# Patient Record
Sex: FEMALE | Race: WHITE | NOT HISPANIC OR LATINO | Employment: UNEMPLOYED | URBAN - METROPOLITAN AREA
[De-identification: names, ages, dates, MRNs, and addresses within clinical notes are randomized per-mention and may not be internally consistent; named-entity substitution may affect disease eponyms.]

---

## 2021-01-19 ENCOUNTER — OFFICE VISIT (OUTPATIENT)
Dept: OBSTETRICS AND GYNECOLOGY | Facility: CLINIC | Age: 21
End: 2021-01-19

## 2021-01-19 VITALS
BODY MASS INDEX: 20.45 KG/M2 | DIASTOLIC BLOOD PRESSURE: 60 MMHG | SYSTOLIC BLOOD PRESSURE: 110 MMHG | HEIGHT: 64 IN | WEIGHT: 119.8 LBS

## 2021-01-19 DIAGNOSIS — N92.6 MISSED MENSES: ICD-10-CM

## 2021-01-19 DIAGNOSIS — Z86.59 HISTORY OF ANOREXIA NERVOSA: ICD-10-CM

## 2021-01-19 DIAGNOSIS — Z13.9 SCREENING FOR CONDITION: Primary | ICD-10-CM

## 2021-01-19 DIAGNOSIS — Z78.9 VEGAN DIET: ICD-10-CM

## 2021-01-19 LAB
B-HCG UR QL: POSITIVE
INTERNAL NEGATIVE CONTROL: NEGATIVE
INTERNAL POSITIVE CONTROL: POSITIVE
Lab: 55

## 2021-01-19 PROCEDURE — 81025 URINE PREGNANCY TEST: CPT | Performed by: OBSTETRICS & GYNECOLOGY

## 2021-01-19 PROCEDURE — 90471 IMMUNIZATION ADMIN: CPT | Performed by: OBSTETRICS & GYNECOLOGY

## 2021-01-19 PROCEDURE — 99204 OFFICE O/P NEW MOD 45 MIN: CPT | Performed by: OBSTETRICS & GYNECOLOGY

## 2021-01-19 PROCEDURE — 90686 IIV4 VACC NO PRSV 0.5 ML IM: CPT | Performed by: OBSTETRICS & GYNECOLOGY

## 2021-01-19 RX ORDER — RANITIDINE 150 MG/1
150 TABLET ORAL DAILY
COMMUNITY

## 2021-01-19 RX ORDER — PRENATAL VIT NO.126/IRON/FOLIC 28MG-0.8MG
1 TABLET ORAL DAILY
COMMUNITY

## 2021-01-19 RX ORDER — DIPHENHYDRAMINE HYDROCHLORIDE 25 MG/1
25 CAPSULE ORAL NIGHTLY
Qty: 60 TABLET | Refills: 1 | Status: SHIPPED | OUTPATIENT
Start: 2021-01-19

## 2021-01-19 RX ORDER — FERROUS SULFATE TAB EC 324 MG (65 MG FE EQUIVALENT) 324 (65 FE) MG
324 TABLET DELAYED RESPONSE ORAL
COMMUNITY

## 2021-01-19 NOTE — PROGRESS NOTES
OB CONFIRMATION APPOINTMENT    CC- Pt has had a menstrual irregularity    Chief Complaint   Patient presents with   • Amenorrhea     +home HCG test         HISTORY OF PRESENT ILLNESS:    Amenorrhea  This is a new problem. The current episode started more than 1 month ago. The problem occurs constantly. The problem has been unchanged. Associated symptoms include fatigue, nausea and vomiting. Pertinent negatives include no abdominal pain. Nothing aggravates the symptoms. She has tried nothing for the symptoms.       Vitaly Plascencia is being seen today to confirm she is pregnant.    She is a 20 y.o.  Patient's last menstrual period was 10/20/2020 (exact date)..  EDC= 21.  Gestational age is 13wks     Current obstetric complaints : nausea and vomiting     Prior obstetric issues, potential pregnancy concerns: none    Family history of genetic issues (includes FOB): no    OFFERED GENETIC TESTING: CfDNA, Cystic fibrosis, Spinal muscular atrophy, Fragile X syndrome: yes    Prior infections concerning in pregnancy (Rash, fever in last 2 weeks): none    HISTORY REVIEWED:      History reviewed. No pertinent past medical history.    History reviewed. No pertinent surgical history.      Current Outpatient Medications:   •  ferrous sulfate 324 (65 Fe) MG tablet delayed-release EC tablet, Take 324 mg by mouth Daily With Breakfast., Disp: , Rfl:   •  prenatal vitamin (prenatal, CLASSIC, vitamin) tablet, Take 1 tablet by mouth Daily., Disp: , Rfl:   •  doxylamine (UNISOM) 25 MG tablet, Take 1 tablet by mouth At Night As Needed for Sleep., Disp: 60 tablet, Rfl: 2  •  raNITIdine (ZANTAC) 150 MG tablet, Take 150 mg by mouth Daily., Disp: , Rfl:   •  vitamin B-6 (PYRIDOXINE) 25 MG tablet, Take 1 tablet by mouth Every Night., Disp: 60 tablet, Rfl: 1    Allergies   Allergen Reactions   • Potassium Citrate Rash       Social History     Socioeconomic History   • Marital status: Single     Spouse name: Not on file   • Number of  "children: Not on file   • Years of education: Not on file   • Highest education level: Not on file   Tobacco Use   • Smoking status: Never Smoker   Substance and Sexual Activity   • Alcohol use: Never     Frequency: Never   • Drug use: Never   • Sexual activity: Yes     Partners: Male       History reviewed. No pertinent family history.    REVIEW OF SYSTEMS:     Review of Systems   Constitutional: Positive for fatigue.   Gastrointestinal: Positive for nausea and vomiting. Negative for abdominal pain.       Physical Exam     Physical Exam  Vitals signs and nursing note reviewed.   Constitutional:       Appearance: She is well-developed.   HENT:      Head: Normocephalic and atraumatic.   Neck:      Musculoskeletal: Normal range of motion.   Cardiovascular:      Rate and Rhythm: Normal rate.   Pulmonary:      Effort: Pulmonary effort is normal.   Abdominal:      General: There is no distension.      Palpations: Abdomen is soft. There is no mass.      Tenderness: There is no abdominal tenderness. There is no guarding.      Comments: + cardiac on doppler   Genitourinary:     Vagina: No vaginal discharge.   Musculoskeletal: Normal range of motion.         General: No tenderness or deformity.   Skin:     General: Skin is warm and dry.      Coloration: Skin is not pale.      Findings: No erythema or rash.   Neurological:      Mental Status: She is alert and oriented to person, place, and time.   Psychiatric:         Behavior: Behavior normal.         Thought Content: Thought content normal.         Judgment: Judgment normal.             Objective    /60   Ht 162.6 cm (64\")   Wt 54.3 kg (119 lb 12.8 oz)   LMP 10/20/2020 (Exact Date)   Breastfeeding No   BMI 20.56 kg/m²     Vitaly Plascencia  reports that she has never smoked. She does not have any smokeless tobacco history on file..               Assessment    1) Pregnancy at Unknown   Diagnoses and all orders for this visit:    1. Screening for condition " (Primary)  -     POC Pregnancy, Urine    2. Missed menses    3. Vegan diet    4. History of anorexia nervosa    Other orders  -     doxylamine (UNISOM) 25 MG tablet; Take 1 tablet by mouth At Night As Needed for Sleep.  Dispense: 60 tablet; Refill: 2  -     vitamin B-6 (PYRIDOXINE) 25 MG tablet; Take 1 tablet by mouth Every Night.  Dispense: 60 tablet; Refill: 1         Plan    Patient is on Prenatal vitamins  Problem list reviewed and updated.  Reviewed routine prenatal care with the patient  Zika (travel restrictions/ok to use insect repellant), not to changing cat litter, food restrictions, avoidance of alcohol, tobacco and drugs and saunas/hot tubs.   All questions answered.     Counseling was given to patient for the following topics: diagnostic results, instructions for management, risk factor reductions, prognosis, patient and family education, impressions, risks and benefits of treatment options and importance of treatment compliance . Total time of the encounter was 50 minutes face to face and 45 minutes was spent counseling.      RTO Return in about 2 weeks (around 2/2/2021) for ob phys w/ pap.    Trung Montesinos MD    1/19/2021  15:26 EST